# Patient Record
Sex: MALE
[De-identification: names, ages, dates, MRNs, and addresses within clinical notes are randomized per-mention and may not be internally consistent; named-entity substitution may affect disease eponyms.]

---

## 2019-09-26 ENCOUNTER — APPOINTMENT (OUTPATIENT)
Dept: OTOLARYNGOLOGY | Facility: CLINIC | Age: 49
End: 2019-09-26
Payer: COMMERCIAL

## 2019-09-26 VITALS
HEIGHT: 67 IN | BODY MASS INDEX: 25.43 KG/M2 | SYSTOLIC BLOOD PRESSURE: 135 MMHG | WEIGHT: 162 LBS | DIASTOLIC BLOOD PRESSURE: 84 MMHG | HEART RATE: 64 BPM

## 2019-09-26 DIAGNOSIS — J34.89 OTHER SPECIFIED DISORDERS OF NOSE AND NASAL SINUSES: ICD-10-CM

## 2019-09-26 DIAGNOSIS — H61.22 IMPACTED CERUMEN, LEFT EAR: ICD-10-CM

## 2019-09-26 DIAGNOSIS — J34.2 DEVIATED NASAL SEPTUM: ICD-10-CM

## 2019-09-26 PROCEDURE — 99203 OFFICE O/P NEW LOW 30 MIN: CPT | Mod: 25

## 2019-09-26 PROCEDURE — 31231 NASAL ENDOSCOPY DX: CPT

## 2019-09-26 PROCEDURE — 69210 REMOVE IMPACTED EAR WAX UNI: CPT

## 2019-09-27 NOTE — HISTORY OF PRESENT ILLNESS
[de-identified] : 48 y/o male with 3 month history of left sided nasal obstruction which is most noticeable when he's laying down/going to sleep but continues throughout the day. He reports having to sleep on his right side to breath through his nose.\par Has history of left sided nosebleeds- 5 years ago- but no issues recently. Notes boxing in school growing up but no other nasal trauma. \par No known snoring.\par No runny nose. No sinus pain or pressure. No recurrent sinus infections.\par \par Also with left ear pain a few days ago- felt water may have been trapped in ear after showering. \par No hearing loss, drainage, pressure, tinnitus AU. No vertigo.

## 2019-09-27 NOTE — CONSULT LETTER
[FreeTextEntry1] : Dear Dr. ELIZABETH POWELL \par I had the pleasure of evaluating your patient EMMANUEL PEACOCK  thank you for allowing us to participate in their care. please see full note detailing our visit below.\par If you have any questions, please do not hesitate to call me and I would be happy to discuss further. \par \par Steve Meade M.D.\par Attending Physician,  \par Department of Otolaryngology - Head and Neck Surgery\par [facility] \par Office: [phone]\par Fax: [phone]\par

## 2019-09-27 NOTE — PROCEDURE
[FreeTextEntry3] : Procedure - Cerumen Removal. \par After informed verbal consent is obtained, cerumen is removed from the left ear canal with an Alligator forceps and suction.  Normal appearing canal following removal.\par  [FreeTextEntry6] : Procedure performed: Nasal Endoscopy- Diagnostic\par Pre / post -procedure indication(s): nasal congestion\par Verbal and/or written consent obtained from patient\par Scope #: 19,  flexible fiber optic telescope used\par The scope was introduced in the nasal passage between the middle and inferior turbinates to exam the inferior portion of the middle meatus and the fontanelle, as well as the maxillary ostia.  Next, the scope was passed medically and posteriorly to the middle turbinates to examine the sphenoethmoid recess and the superior turbinate region.\par Upon visualization the finders are as follows:\par Nasal Septum: severe left septal deviation \par B/L: Mucosa: pink and moist, Mucous: scant, Polyp: not seen, Inferior Turbinate, Middle and Superior Turbinate: normal, Inferior Meatus: narrow, Middle Meatus: narrow, Super Meatus:normal, Sphenoethmoidal Recess: clear.  Eustachian tube clear. Poor tip support.\par The patient tolerated the procedure well\par

## 2019-09-27 NOTE — PHYSICAL EXAM
[Midline] : trachea located in midline position [Normal] : no rashes [de-identified] : AS: cerumen against TM [de-identified] : poor tip support

## 2019-09-27 NOTE — ASSESSMENT
[FreeTextEntry1] : Pt with left sided nasal obstruction and hx of left sided epistaxis with severe left septal deviation and poor tip support on exam.\par - Will start Flonase. A topical steroid reduce mucosal swelling, illustrated appropriate use and how to reduce the risk of bleeding \par \par Left CI\par - Cleared\par - Ear hygiene\par - Discussed preventive measures and signs of accumulation\par \par F/up in 1 month.\par \par \par I personally saw and examined EMMANUEL PEACOCK in detail. I spoke to CORI Ortiz regarding the assessment and plan of care.  I preformed the procedures and I reviewed the above assessment and plan of care, and agree. I have made changes in changes in the body of the note where appropriate.\par \par \par

## 2019-10-24 ENCOUNTER — APPOINTMENT (OUTPATIENT)
Dept: OTOLARYNGOLOGY | Facility: CLINIC | Age: 49
End: 2019-10-24